# Patient Record
Sex: FEMALE | Race: AMERICAN INDIAN OR ALASKA NATIVE | NOT HISPANIC OR LATINO | URBAN - METROPOLITAN AREA
[De-identification: names, ages, dates, MRNs, and addresses within clinical notes are randomized per-mention and may not be internally consistent; named-entity substitution may affect disease eponyms.]

---

## 2023-06-08 ENCOUNTER — EMERGENCY (EMERGENCY)
Facility: HOSPITAL | Age: 68
LOS: 1 days | Discharge: HOME | End: 2023-06-08
Attending: EMERGENCY MEDICINE
Payer: COMMERCIAL

## 2023-06-08 VITALS
TEMPERATURE: 99 F | WEIGHT: 139.99 LBS | HEART RATE: 71 BPM | HEIGHT: 63 IN | OXYGEN SATURATION: 99 % | DIASTOLIC BLOOD PRESSURE: 81 MMHG | RESPIRATION RATE: 20 BRPM | SYSTOLIC BLOOD PRESSURE: 145 MMHG

## 2023-06-08 PROCEDURE — 99285 EMERGENCY DEPT VISIT HI MDM: CPT | Mod: 25

## 2023-06-08 PROCEDURE — 90715 TDAP VACCINE 7 YRS/> IM: CPT

## 2023-06-08 PROCEDURE — 99284 EMERGENCY DEPT VISIT MOD MDM: CPT

## 2023-06-08 PROCEDURE — 90471 IMMUNIZATION ADMIN: CPT

## 2023-06-08 RX ORDER — TETANUS TOXOID, REDUCED DIPHTHERIA TOXOID AND ACELLULAR PERTUSSIS VACCINE, ADSORBED 5; 2.5; 8; 8; 2.5 [IU]/.5ML; [IU]/.5ML; UG/.5ML; UG/.5ML; UG/.5ML
0.5 SUSPENSION INTRAMUSCULAR ONCE
Refills: 0 | Status: COMPLETED | OUTPATIENT
Start: 2023-06-08 | End: 2023-06-08

## 2023-06-08 RX ADMIN — TETANUS TOXOID, REDUCED DIPHTHERIA TOXOID AND ACELLULAR PERTUSSIS VACCINE, ADSORBED 0.5 MILLILITER(S): 5; 2.5; 8; 8; 2.5 SUSPENSION INTRAMUSCULAR at 21:53
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
## 2023-06-08 NOTE — ED PROVIDER NOTE - PATIENT PORTAL LINK FT
You can access the FollowMyHealth Patient Portal offered by [facility] by registering at the following website: [email or web address]. By joining [employer]’s FollowMyHealth portal, you will also be able to view your health information using other applications (apps) compatible with our system.
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
## 2023-06-08 NOTE — ED PROVIDER NOTE - NSFOLLOWUPCLINICS_GEN_ALL_ED_FT
[facility]  Wound Care  [address] [place] [address]  Phone: [phone]  Fax: [phone]  Follow Up Time: 1-3 Days

## 2023-06-13 ENCOUNTER — OUTPATIENT (OUTPATIENT)
Dept: OUTPATIENT SERVICES | Facility: HOSPITAL | Age: 68
LOS: 1 days | Discharge: HOME | End: 2023-06-13

## 2023-06-13 DIAGNOSIS — L89.90 PRESSURE ULCER OF UNSPECIFIED SITE, UNSPECIFIED STAGE: ICD-10-CM

## 2023-06-14 DIAGNOSIS — Z82.49 FAMILY HISTORY OF ISCHEMIC HEART DISEASE AND OTHER DISEASES OF THE CIRCULATORY SYSTEM: ICD-10-CM
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
## 2023-06-14 DIAGNOSIS — I10 ESSENTIAL (PRIMARY) HYPERTENSION: ICD-10-CM

## 2023-06-14 DIAGNOSIS — Z79.899 OTHER LONG TERM (CURRENT) DRUG THERAPY: ICD-10-CM

## 2023-06-14 DIAGNOSIS — R56.9 UNSPECIFIED CONVULSIONS: ICD-10-CM
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
## 2023-06-14 DIAGNOSIS — T31.10 BURNS INVOLVING 10-19% OF BODY SURFACE WITH 0% TO 9% THIRD DEGREE BURNS: ICD-10-CM
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
## 2023-06-14 DIAGNOSIS — Z79.82 LONG TERM (CURRENT) USE OF ASPIRIN: ICD-10-CM

## 2023-06-14 DIAGNOSIS — T23.231A BURN OF SECOND DEGREE OF MULTIPLE RIGHT FINGERS (NAIL), NOT INCLUDING THUMB, INITIAL ENCOUNTER: ICD-10-CM
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
## 2023-06-22 ENCOUNTER — OUTPATIENT (OUTPATIENT)
Dept: OUTPATIENT SERVICES | Facility: HOSPITAL | Age: 68
LOS: 1 days | Discharge: HOME | End: 2023-06-22

## 2023-06-22 DIAGNOSIS — L89.90 PRESSURE ULCER OF UNSPECIFIED SITE, UNSPECIFIED STAGE: ICD-10-CM

## 2023-06-23 DIAGNOSIS — L97.312 NON-PRESSURE CHRONIC ULCER OF RIGHT ANKLE WITH FAT LAYER EXPOSED: ICD-10-CM

## 2023-06-23 DIAGNOSIS — E11.621 TYPE 2 DIABETES MELLITUS WITH FOOT ULCER: ICD-10-CM

## 2023-06-23 DIAGNOSIS — L97.512 NON-PRESSURE CHRONIC ULCER OF OTHER PART OF RIGHT FOOT WITH FAT LAYER EXPOSED: ICD-10-CM

## 2023-06-23 DIAGNOSIS — I10 ESSENTIAL (PRIMARY) HYPERTENSION: ICD-10-CM

## 2023-06-23 DIAGNOSIS — Z79.899 OTHER LONG TERM (CURRENT) DRUG THERAPY: ICD-10-CM

## 2023-06-23 DIAGNOSIS — L03.116 CELLULITIS OF LEFT LOWER LIMB: ICD-10-CM

## 2023-06-23 DIAGNOSIS — I87.313 CHRONIC VENOUS HYPERTENSION (IDIOPATHIC) WITH ULCER OF BILATERAL LOWER EXTREMITY: ICD-10-CM

## 2023-06-23 DIAGNOSIS — Z79.84 LONG TERM (CURRENT) USE OF ORAL HYPOGLYCEMIC DRUGS: ICD-10-CM

## 2023-06-23 DIAGNOSIS — L03.115 CELLULITIS OF RIGHT LOWER LIMB: ICD-10-CM

## 2023-06-23 DIAGNOSIS — L97.522 NON-PRESSURE CHRONIC ULCER OF OTHER PART OF LEFT FOOT WITH FAT LAYER EXPOSED: ICD-10-CM

## 2023-06-23 DIAGNOSIS — F17.210 NICOTINE DEPENDENCE, CIGARETTES, UNCOMPLICATED: ICD-10-CM

## 2023-06-23 DIAGNOSIS — H26.8 OTHER SPECIFIED CATARACT: ICD-10-CM

## 2023-06-29 ENCOUNTER — OUTPATIENT (OUTPATIENT)
Dept: OUTPATIENT SERVICES | Facility: HOSPITAL | Age: 68
LOS: 1 days | Discharge: HOME | End: 2023-06-29

## 2023-06-29 DIAGNOSIS — L89.90 PRESSURE ULCER OF UNSPECIFIED SITE, UNSPECIFIED STAGE: ICD-10-CM

## 2023-06-30 DIAGNOSIS — T23.231A BURN OF SECOND DEGREE OF MULTIPLE RIGHT FINGERS (NAIL), NOT INCLUDING THUMB, INITIAL ENCOUNTER: ICD-10-CM

## 2023-06-30 DIAGNOSIS — Z79.899 OTHER LONG TERM (CURRENT) DRUG THERAPY: ICD-10-CM
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
## 2023-06-30 DIAGNOSIS — R56.9 UNSPECIFIED CONVULSIONS: ICD-10-CM
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
## 2023-06-30 DIAGNOSIS — Z79.82 LONG TERM (CURRENT) USE OF ASPIRIN: ICD-10-CM

## 2023-06-30 DIAGNOSIS — I10 ESSENTIAL (PRIMARY) HYPERTENSION: ICD-10-CM
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
## 2023-06-30 DIAGNOSIS — T31.10 BURNS INVOLVING 10-19% OF BODY SURFACE WITH 0% TO 9% THIRD DEGREE BURNS: ICD-10-CM

## 2023-07-06 ENCOUNTER — OUTPATIENT (OUTPATIENT)
Dept: OUTPATIENT SERVICES | Facility: HOSPITAL | Age: 68
LOS: 1 days | Discharge: HOME | End: 2023-07-06

## 2023-07-06 DIAGNOSIS — T23.231A BURN OF SECOND DEGREE OF MULTIPLE RIGHT FINGERS (NAIL), NOT INCLUDING THUMB, INITIAL ENCOUNTER: ICD-10-CM

## 2023-07-06 DIAGNOSIS — I10 ESSENTIAL (PRIMARY) HYPERTENSION: ICD-10-CM
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
## 2023-07-06 DIAGNOSIS — L89.90 PRESSURE ULCER OF UNSPECIFIED SITE, UNSPECIFIED STAGE: ICD-10-CM

## 2023-07-06 DIAGNOSIS — Z79.82 LONG TERM (CURRENT) USE OF ASPIRIN: ICD-10-CM
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
## 2023-07-06 DIAGNOSIS — T31.10 BURNS INVOLVING 10-19% OF BODY SURFACE WITH 0% TO 9% THIRD DEGREE BURNS: ICD-10-CM

## 2023-07-06 DIAGNOSIS — Z79.899 OTHER LONG TERM (CURRENT) DRUG THERAPY: ICD-10-CM
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
## 2023-07-06 DIAGNOSIS — R56.9 UNSPECIFIED CONVULSIONS: ICD-10-CM

## 2023-07-11 PROBLEM — Z00.00 ENCOUNTER FOR PREVENTIVE HEALTH EXAMINATION: Status: ACTIVE | Noted: 2023-07-11

## 2023-07-18 ENCOUNTER — APPOINTMENT (OUTPATIENT)
Dept: NEUROLOGY | Facility: CLINIC | Age: 68
End: 2023-07-18
Payer: MEDICARE

## 2023-07-18 VITALS
HEIGHT: 60 IN | BODY MASS INDEX: 28.47 KG/M2 | DIASTOLIC BLOOD PRESSURE: 77 MMHG | SYSTOLIC BLOOD PRESSURE: 118 MMHG | HEART RATE: 62 BPM | WEIGHT: 145 LBS

## 2023-07-18 PROCEDURE — 99205 OFFICE O/P NEW HI 60 MIN: CPT

## 2023-08-03 ENCOUNTER — INPATIENT (INPATIENT)
Facility: HOSPITAL | Age: 68
LOS: 1 days | Discharge: HOME | DRG: 101 | End: 2023-08-05
Attending: PSYCHIATRY & NEUROLOGY | Admitting: PSYCHIATRY & NEUROLOGY
Payer: COMMERCIAL

## 2023-08-03 VITALS
HEIGHT: 60 IN | TEMPERATURE: 98 F | DIASTOLIC BLOOD PRESSURE: 79 MMHG | OXYGEN SATURATION: 98 % | WEIGHT: 140.88 LBS | HEART RATE: 73 BPM | SYSTOLIC BLOOD PRESSURE: 119 MMHG | RESPIRATION RATE: 18 BRPM

## 2023-08-03 DIAGNOSIS — G40.909 EPILEPSY, UNSPECIFIED, NOT INTRACTABLE, WITHOUT STATUS EPILEPTICUS: ICD-10-CM

## 2023-08-03 LAB
ALBUMIN SERPL ELPH-MCNC: 4.6 G/DL — SIGNIFICANT CHANGE UP (ref 3.3–5)
ALP SERPL-CCNC: 88 U/L — SIGNIFICANT CHANGE UP (ref 40–120)
ALT FLD-CCNC: 17 U/L — SIGNIFICANT CHANGE UP (ref 10–45)
ANION GAP SERPL CALC-SCNC: 13 MMOL/L — SIGNIFICANT CHANGE UP (ref 5–17)
AST SERPL-CCNC: 25 U/L — SIGNIFICANT CHANGE UP (ref 10–40)
BASOPHILS # BLD AUTO: 0 K/UL — SIGNIFICANT CHANGE UP (ref 0–0.2)
BASOPHILS NFR BLD AUTO: 0 % — SIGNIFICANT CHANGE UP (ref 0–2)
BILIRUB SERPL-MCNC: 0.2 MG/DL — SIGNIFICANT CHANGE UP (ref 0.2–1.2)
BUN SERPL-MCNC: 15 MG/DL — SIGNIFICANT CHANGE UP (ref 7–23)
CALCIUM SERPL-MCNC: 10 MG/DL — SIGNIFICANT CHANGE UP (ref 8.4–10.5)
CARBAMAZEPINE SERPL-MCNC: 6.7 UG/ML — SIGNIFICANT CHANGE UP (ref 4–12)
CHLORIDE SERPL-SCNC: 102 MMOL/L — SIGNIFICANT CHANGE UP (ref 96–108)
CO2 SERPL-SCNC: 26 MMOL/L — SIGNIFICANT CHANGE UP (ref 22–31)
CREAT SERPL-MCNC: 0.56 MG/DL — SIGNIFICANT CHANGE UP (ref 0.5–1.3)
EGFR: 101 ML/MIN/1.73M2 — SIGNIFICANT CHANGE UP
EOSINOPHIL # BLD AUTO: 0.25 K/UL — SIGNIFICANT CHANGE UP (ref 0–0.5)
EOSINOPHIL NFR BLD AUTO: 2.6 % — SIGNIFICANT CHANGE UP (ref 0–6)
GLUCOSE SERPL-MCNC: 116 MG/DL — HIGH (ref 70–99)
HCT VFR BLD CALC: 38.7 % — SIGNIFICANT CHANGE UP (ref 34.5–45)
HGB BLD-MCNC: 12.9 G/DL — SIGNIFICANT CHANGE UP (ref 11.5–15.5)
LYMPHOCYTES # BLD AUTO: 4.13 K/UL — HIGH (ref 1–3.3)
LYMPHOCYTES # BLD AUTO: 43.4 % — SIGNIFICANT CHANGE UP (ref 13–44)
MAGNESIUM SERPL-MCNC: 2.2 MG/DL — SIGNIFICANT CHANGE UP (ref 1.6–2.6)
MANUAL SMEAR VERIFICATION: SIGNIFICANT CHANGE UP
MCHC RBC-ENTMCNC: 30.6 PG — SIGNIFICANT CHANGE UP (ref 27–34)
MCHC RBC-ENTMCNC: 33.3 GM/DL — SIGNIFICANT CHANGE UP (ref 32–36)
MCV RBC AUTO: 91.9 FL — SIGNIFICANT CHANGE UP (ref 80–100)
MONOCYTES # BLD AUTO: 1.18 K/UL — HIGH (ref 0–0.9)
MONOCYTES NFR BLD AUTO: 12.4 % — SIGNIFICANT CHANGE UP (ref 2–14)
NEUTROPHILS # BLD AUTO: 3.96 K/UL — SIGNIFICANT CHANGE UP (ref 1.8–7.4)
NEUTROPHILS NFR BLD AUTO: 41.6 % — LOW (ref 43–77)
NRBC # BLD: 1 /100 — HIGH (ref 0–0)
PLAT MORPH BLD: NORMAL — SIGNIFICANT CHANGE UP
PLATELET # BLD AUTO: 150 K/UL — SIGNIFICANT CHANGE UP (ref 150–400)
POTASSIUM SERPL-MCNC: 4.1 MMOL/L — SIGNIFICANT CHANGE UP (ref 3.5–5.3)
POTASSIUM SERPL-SCNC: 4.1 MMOL/L — SIGNIFICANT CHANGE UP (ref 3.5–5.3)
PROT SERPL-MCNC: 8.2 G/DL — SIGNIFICANT CHANGE UP (ref 6–8.3)
RBC # BLD: 4.21 M/UL — SIGNIFICANT CHANGE UP (ref 3.8–5.2)
RBC # FLD: 14.4 % — SIGNIFICANT CHANGE UP (ref 10.3–14.5)
RBC BLD AUTO: SIGNIFICANT CHANGE UP
SODIUM SERPL-SCNC: 141 MMOL/L — SIGNIFICANT CHANGE UP (ref 135–145)
WBC # BLD: 9.51 K/UL — SIGNIFICANT CHANGE UP (ref 3.8–10.5)
WBC # FLD AUTO: 9.51 K/UL — SIGNIFICANT CHANGE UP (ref 3.8–10.5)

## 2023-08-03 PROCEDURE — 99223 1ST HOSP IP/OBS HIGH 75: CPT

## 2023-08-03 PROCEDURE — 95720 EEG PHY/QHP EA INCR W/VEEG: CPT

## 2023-08-03 RX ORDER — CALCIUM CARBONATE 500(1250)
1 TABLET ORAL AT BEDTIME
Refills: 0 | Status: DISCONTINUED | OUTPATIENT
Start: 2023-08-03 | End: 2023-08-05

## 2023-08-03 RX ORDER — LORATADINE 10 MG/1
10 TABLET ORAL AT BEDTIME
Refills: 0 | Status: DISCONTINUED | OUTPATIENT
Start: 2023-08-03 | End: 2023-08-05

## 2023-08-03 RX ORDER — ATORVASTATIN CALCIUM 80 MG/1
10 TABLET, FILM COATED ORAL AT BEDTIME
Refills: 0 | Status: DISCONTINUED | OUTPATIENT
Start: 2023-08-03 | End: 2023-08-05

## 2023-08-03 RX ORDER — PANTOPRAZOLE SODIUM 20 MG/1
40 TABLET, DELAYED RELEASE ORAL AT BEDTIME
Refills: 0 | Status: DISCONTINUED | OUTPATIENT
Start: 2023-08-03 | End: 2023-08-05

## 2023-08-03 RX ORDER — LACOSAMIDE 50 MG/1
100 TABLET ORAL ONCE
Refills: 0 | Status: DISCONTINUED | OUTPATIENT
Start: 2023-08-03 | End: 2023-08-05

## 2023-08-03 RX ORDER — LOSARTAN POTASSIUM 100 MG/1
100 TABLET, FILM COATED ORAL AT BEDTIME
Refills: 0 | Status: DISCONTINUED | OUTPATIENT
Start: 2023-08-03 | End: 2023-08-05

## 2023-08-03 RX ORDER — ASPIRIN/CALCIUM CARB/MAGNESIUM 324 MG
81 TABLET ORAL AT BEDTIME
Refills: 0 | Status: DISCONTINUED | OUTPATIENT
Start: 2023-08-03 | End: 2023-08-05

## 2023-08-03 RX ORDER — ACETAMINOPHEN 500 MG
650 TABLET ORAL EVERY 6 HOURS
Refills: 0 | Status: DISCONTINUED | OUTPATIENT
Start: 2023-08-03 | End: 2023-08-05

## 2023-08-03 RX ORDER — ENOXAPARIN SODIUM 100 MG/ML
40 INJECTION SUBCUTANEOUS EVERY 24 HOURS
Refills: 0 | Status: DISCONTINUED | OUTPATIENT
Start: 2023-08-03 | End: 2023-08-05

## 2023-08-03 RX ORDER — AMLODIPINE BESYLATE 2.5 MG/1
10 TABLET ORAL AT BEDTIME
Refills: 0 | Status: DISCONTINUED | OUTPATIENT
Start: 2023-08-03 | End: 2023-08-05

## 2023-08-03 RX ORDER — METOPROLOL TARTRATE 50 MG
200 TABLET ORAL DAILY
Refills: 0 | Status: DISCONTINUED | OUTPATIENT
Start: 2023-08-03 | End: 2023-08-03

## 2023-08-03 RX ORDER — CHOLECALCIFEROL (VITAMIN D3) 125 MCG
1000 CAPSULE ORAL AT BEDTIME
Refills: 0 | Status: DISCONTINUED | OUTPATIENT
Start: 2023-08-03 | End: 2023-08-05

## 2023-08-03 RX ORDER — FOLIC ACID 0.8 MG
1 TABLET ORAL AT BEDTIME
Refills: 0 | Status: DISCONTINUED | OUTPATIENT
Start: 2023-08-03 | End: 2023-08-05

## 2023-08-03 RX ORDER — DONEPEZIL HYDROCHLORIDE 10 MG/1
10 TABLET, FILM COATED ORAL AT BEDTIME
Refills: 0 | Status: DISCONTINUED | OUTPATIENT
Start: 2023-08-03 | End: 2023-08-05

## 2023-08-03 RX ORDER — METOPROLOL TARTRATE 50 MG
100 TABLET ORAL
Refills: 0 | Status: DISCONTINUED | OUTPATIENT
Start: 2023-08-03 | End: 2023-08-05

## 2023-08-03 RX ORDER — CARBAMAZEPINE 200 MG
200 TABLET ORAL
Refills: 0 | Status: DISCONTINUED | OUTPATIENT
Start: 2023-08-03 | End: 2023-08-05

## 2023-08-03 RX ORDER — MONTELUKAST 4 MG/1
10 TABLET, CHEWABLE ORAL AT BEDTIME
Refills: 0 | Status: DISCONTINUED | OUTPATIENT
Start: 2023-08-03 | End: 2023-08-05

## 2023-08-03 RX ADMIN — Medication 1 MILLIGRAM(S): at 21:34

## 2023-08-03 RX ADMIN — Medication 200 MILLIGRAM(S): at 18:30

## 2023-08-03 RX ADMIN — Medication 1000 UNIT(S): at 21:35

## 2023-08-03 RX ADMIN — LORATADINE 10 MILLIGRAM(S): 10 TABLET ORAL at 21:35

## 2023-08-03 RX ADMIN — MONTELUKAST 10 MILLIGRAM(S): 4 TABLET, CHEWABLE ORAL at 21:34

## 2023-08-03 RX ADMIN — Medication 100 MILLIGRAM(S): at 17:45

## 2023-08-03 RX ADMIN — PANTOPRAZOLE SODIUM 40 MILLIGRAM(S): 20 TABLET, DELAYED RELEASE ORAL at 21:34

## 2023-08-03 RX ADMIN — Medication 81 MILLIGRAM(S): at 21:34

## 2023-08-03 RX ADMIN — Medication 650 MILLIGRAM(S): at 21:46

## 2023-08-03 RX ADMIN — ATORVASTATIN CALCIUM 10 MILLIGRAM(S): 80 TABLET, FILM COATED ORAL at 21:34

## 2023-08-03 RX ADMIN — Medication 1 TABLET(S): at 21:35

## 2023-08-03 RX ADMIN — Medication 650 MILLIGRAM(S): at 22:16

## 2023-08-03 RX ADMIN — AMLODIPINE BESYLATE 10 MILLIGRAM(S): 2.5 TABLET ORAL at 21:34

## 2023-08-03 RX ADMIN — ENOXAPARIN SODIUM 40 MILLIGRAM(S): 100 INJECTION SUBCUTANEOUS at 21:36

## 2023-08-03 RX ADMIN — DONEPEZIL HYDROCHLORIDE 10 MILLIGRAM(S): 10 TABLET, FILM COATED ORAL at 21:35

## 2023-08-03 NOTE — H&P ADULT - NSHPPHYSICALEXAM_GEN_ALL_CORE
Vitals:  T(C): 36.9 (08-03-23 @ 15:18), Max: 36.9 (08-03-23 @ 15:18)  HR: 73 (08-03-23 @ 15:18) (73 - 73)  BP: 119/79 (08-03-23 @ 15:18) (119/79 - 119/79)  RR: 18 (08-03-23 @ 15:18) (18 - 18)  SpO2: 98% (08-03-23 @ 15:18) (98% - 98%)    Physical Examination: INCOMPLETE  General - NAD  Cardiovascular - Peripheral pulses palpable, no edema  Eyes - Fundoscopy with flat, sharp optic discs and no hemorrhage or exudates; Fundoscopy not well visualized; Fundoscopy not performed due to safety precautions in the setting of the COVID-19 pandemic    Neurologic Exam:  Mental status - Awake, Alert, Oriented to person, place, and time. Speech fluent, repetition and naming intact. Follows simple and complex commands. Attention/concentration, recent and remote memory (including registration and recall), and fund of knowledge intact    Cranial nerves - PERRLA, VFF, EOMI, face sensation (V1-V3) intact b/l, facial strength intact without asymmetry b/l, hearing intact b/l, palate with symmetric elevation, trapezius OR sternocleidomastiod 5/5 strength b/l, tongue midline on protrusion with full lateral movement    Motor - Normal bulk and tone throughout. No pronator drift.  Strength testing            Deltoid      Biceps      Triceps     Wrist Extension    Wrist Flexion     Interossei         R            5                 5               5                     5                              5                        5                 5  L             5                 5               5                     5                              5                        5                 5              Hip Flexion    Hip Extension    Knee Flexion    Knee Extension    Dorsiflexion    Plantar Flexion  R              5                           5                       5                           5                            5                          5  L              5                           5                        5                           5                            5                          5    Sensation - Light touch/temperature OR pain/vibration intact throughout    DTR's -             Biceps      Triceps     Brachioradialis      Patellar    Ankle    Toes/plantar response  R             2+             2+                  2+                       2+            2+                 Down  L              2+             2+                 2+                        2+           2+                 Down    Coordination - Finger to Nose intact b/l. No tremors appreciated    Gait and station - Normal casual gait. Romberg (-) Vitals:  T(C): 36.9 (08-03-23 @ 15:18), Max: 36.9 (08-03-23 @ 15:18)  HR: 73 (08-03-23 @ 15:18) (73 - 73)  BP: 119/79 (08-03-23 @ 15:18) (119/79 - 119/79)  RR: 18 (08-03-23 @ 15:18) (18 - 18)  SpO2: 98% (08-03-23 @ 15:18) (98% - 98%)    Physical Examination: VERY LIMITED d/t patient preferring female providers and none available to examine patient at this time.  General - NAD, sitting up in bed, vEEG leads are being placed  Cardiovascular - No LE edema  Eyes - Fundoscopy not performed due to safety precautions in the setting of the COVID-19 pandemic, conjunctiva are non-injected    Neurologic Exam:  Mental status - Awake, Alert, Oriented to person, place (knows hospital but not the name), and time. Struggles with repetition (but may also represent difficulty with English language). Follows simple commands. Struggles with complex command. 2/3 on 3-word recall, 3/3 with cue - slow to remember. Does appear to have a degree of cognitive slowing.    Cranial nerves - Pupils are pinpoint in the room light - left pupil smaller than right, EOMI, facial strength intact without asymmetry b/l, hearing grossly intact, palate with symmetric elevation, tongue midline on protrusion with full lateral movement    Motor - BASS equally, no drift UE/LE    Sensation - Light touch intact throughout, vibration sensation decreased in the LEs    DTR's - Attempted to elicit reflexes but could not with patient's positioning and inability to readjust patient    Coordination - No tremors appreciated    Gait and station - Unable to walk patient d/t vEEG leads being applied

## 2023-08-03 NOTE — H&P ADULT - NSHPREVIEWOFSYSTEMS_GEN_ALL_CORE
Review of Systems:  INCOMPLETE   CONSTITUTIONAL: No fevers or chills  EYES AND ENT: No visual changes or no throat pain   NECK: No pain or stiffness  RESPIRATORY: No hemoptysis or shortness of breath  CARDIOVASCULAR: No chest pain or palpitations  GASTROINTESTINAL: No melena or hematochezia  GENITOURINARY: No dysuria or hematuria  NEUROLOGICAL: +As stated in HPI above  SKIN: No itching, burning, rashes, or lesions   All other review of systems is negative unless indicated above. All other review of systems is negative unless indicated above.

## 2023-08-03 NOTE — H&P ADULT - ASSESSMENT
Patient JEFF ARGUETA is a 67y (1955) woman who presents to [facility] for elective EMU admission for medication tapering for ictal/interictal recording. Patient was seen by Dr. Oconnor in the outpatient setting on 7/18/2023. Currently taking carbamezapine XR 200mg BID.    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 carbamazepine XR, with ongoing memory decline 2/2 seizures; EMU admission for medication tapering for ictal/interictal recording; consideration for cenobamate    [] Admit to EMU  [] vEEG  [] Telemetry  [] CBC, CMP, Mg, carbamazepine level  [] CONTINUE carbamazepine XR 200mg BID  [] Seizure/Fall Precautions  [] CM/SW Consults    Diet: Regular  DVT Prophylaxis: enoxaparin 40mg subcutaneous daily    Rescues:  - lorazepam 2mg IV  - lacosamide 100mg IV    Home ASM Regimen:  carbamazepine XR 200mg BID Patient JEFF ARGUETA is a 67y (1955) woman who presents to [facility] for elective EMU admission for medication tapering for ictal/interictal recording. Patient was seen by Dr. Oconnor in the outpatient setting on 7/18/2023. Currently taking carbamezapine XR 200mg BID.    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 outpatient)  [] Seizure/Fall Precautions  [] CM/SW Consults    Diet: Regular (Halal)  DVT Prophylaxis: enoxaparin 40mg subcutaneous daily    Rescues:  - lorazepam 2mg IV  - lacosamide 100mg IV    Home ASM Regimen:  carbamazepine XR 200mg BID    Please note: STRONGLY PREFERS FEMALE PROVIDERS, male provider can examine patient if no female provider is available or if there is an emergency

## 2023-08-03 NOTE — H&P ADULT - HISTORY OF PRESENT ILLNESS
Patient JEFF ARGUETA is a 67y (1955) woman who presents to [facility] for elective EMU admission for medication tapering for ictal/interictal recording. Patient was seen by Dr. Oconnor in the outpatient setting on 7/18/2023.    Per chart review:  Patient has reportedly had epilepsy since age 28. Previously, seizures coincided with iron injections. Semiology was described as LOC and convulsions. Previously had been taking phenobarbital 50mg daily, phenytoin 100mg BID, OXC 300mg BID. Now taking Tegretol XR brand 200mg BID. Aura described as spinning sensation, followed by a crying sound and LOC. Also has had periods of unresponsiveness and automatic hand movements - without convulsions. Has had an EEG in the past with documented LT rhythmic theta, but with no seizures. Normal MRI about 4 years ago.    Today, *** Patient JEFF ARGUETA is a 67y (1955) woman who presents to [facility] for elective EMU admission for medication tapering for ictal/interictal recording. Patient was seen by Dr. Oconnor in the outpatient setting on 7/18/2023.    Per chart review:  Patient has reportedly had epilepsy since age 28. Previously, seizures coincided with iron injections. Semiology was described as LOC and convulsions. Previously had been taking phenobarbital 50mg daily, phenytoin 100mg BID, OXC 300mg BID. Now taking Tegretol XR brand 200mg BID. Aura described as spinning sensation, followed by a crying sound and LOC. Also has had periods of unresponsiveness and automatic hand movements - without convulsions. Has had an EEG in the past with documented LT rhythmic theta, but with no seizures. Normal MRI about 4 years ago.    Today, patient is encountered bedside while having vEEG leads applied. She is accompanied by her . Amharic  used for initial portion of interview. D/w patient and her  that vEEG leads will be applied, patient will be monitored overnight on her ASM. Adjustments may be made to medication in the AM. Both express understanding.    Later, patient's CARLOTA arrives bedside. He provides Amharic translation. Patient is Che[employer] - halal meal order has been placed. Patient prefers female providers - as such, physical exam is limited.    Patient is a nonsmoker. No alcohol/recreational drug use. Patient uses a cane to ambulate. Very slow climbing stairs. History of coronary artery bypass per CARLOTA - approximately 12 years ago. Denies history of stroke. Medication list obtained from CARLOTA.

## 2023-08-03 NOTE — PATIENT PROFILE ADULT - FALL HARM RISK - RISK INTERVENTIONS
No
Assistance OOB with selected safe patient handling equipment/Communicate Fall Risk and Risk Factors to all staff, patient, and family/Discuss with provider need for PT consult/Monitor gait and stability/Provide patient with walking aids - walker, cane, crutches/Reinforce activity limits and safety measures with patient and family/Visual Cue: Yellow wristband/Bed in lowest position, wheels locked, appropriate side rails in place/Call bell, personal items and telephone in reach/Instruct patient to call for assistance before getting out of bed or chair/Non-slip footwear when patient is out of bed/[place] to call system/Physically safe environment - no spills, clutter or unnecessary equipment/Purposeful Proactive Rounding/Room/bathroom lighting operational, light cord in reach

## 2023-08-04 LAB
ALBUMIN SERPL ELPH-MCNC: 3.8 G/DL — SIGNIFICANT CHANGE UP (ref 3.3–5)
ALP SERPL-CCNC: 76 U/L — SIGNIFICANT CHANGE UP (ref 40–120)
ALT FLD-CCNC: 14 U/L — SIGNIFICANT CHANGE UP (ref 10–45)
ANION GAP SERPL CALC-SCNC: 13 MMOL/L — SIGNIFICANT CHANGE UP (ref 5–17)
AST SERPL-CCNC: 21 U/L — SIGNIFICANT CHANGE UP (ref 10–40)
BILIRUB SERPL-MCNC: 0.2 MG/DL — SIGNIFICANT CHANGE UP (ref 0.2–1.2)
BUN SERPL-MCNC: 14 MG/DL — SIGNIFICANT CHANGE UP (ref 7–23)
CALCIUM SERPL-MCNC: 9.7 MG/DL — SIGNIFICANT CHANGE UP (ref 8.4–10.5)
CHLORIDE SERPL-SCNC: 103 MMOL/L — SIGNIFICANT CHANGE UP (ref 96–108)
CO2 SERPL-SCNC: 22 MMOL/L — SIGNIFICANT CHANGE UP (ref 22–31)
CREAT SERPL-MCNC: 0.57 MG/DL — SIGNIFICANT CHANGE UP (ref 0.5–1.3)
EGFR: 100 ML/MIN/1.73M2 — SIGNIFICANT CHANGE UP
GLUCOSE SERPL-MCNC: 100 MG/DL — HIGH (ref 70–99)
HCT VFR BLD CALC: 37.1 % — SIGNIFICANT CHANGE UP (ref 34.5–45)
HCV AB S/CO SERPL IA: 0.09 S/CO — SIGNIFICANT CHANGE UP (ref 0–0.99)
HCV AB SERPL-IMP: SIGNIFICANT CHANGE UP
HGB BLD-MCNC: 12.5 G/DL — SIGNIFICANT CHANGE UP (ref 11.5–15.5)
MAGNESIUM SERPL-MCNC: 2.1 MG/DL — SIGNIFICANT CHANGE UP (ref 1.6–2.6)
MCHC RBC-ENTMCNC: 31.4 PG — SIGNIFICANT CHANGE UP (ref 27–34)
MCHC RBC-ENTMCNC: 33.7 GM/DL — SIGNIFICANT CHANGE UP (ref 32–36)
MCV RBC AUTO: 93.2 FL — SIGNIFICANT CHANGE UP (ref 80–100)
NRBC # BLD: 0 /100 WBCS — SIGNIFICANT CHANGE UP (ref 0–0)
PHOSPHATE SERPL-MCNC: 4.4 MG/DL — SIGNIFICANT CHANGE UP (ref 2.5–4.5)
PLATELET # BLD AUTO: 131 K/UL — LOW (ref 150–400)
POTASSIUM SERPL-MCNC: 4 MMOL/L — SIGNIFICANT CHANGE UP (ref 3.5–5.3)
POTASSIUM SERPL-SCNC: 4 MMOL/L — SIGNIFICANT CHANGE UP (ref 3.5–5.3)
PROT SERPL-MCNC: 7.1 G/DL — SIGNIFICANT CHANGE UP (ref 6–8.3)
RBC # BLD: 3.98 M/UL — SIGNIFICANT CHANGE UP (ref 3.8–5.2)
RBC # FLD: 14.2 % — SIGNIFICANT CHANGE UP (ref 10.3–14.5)
SODIUM SERPL-SCNC: 138 MMOL/L — SIGNIFICANT CHANGE UP (ref 135–145)
WBC # BLD: 8.47 K/UL — SIGNIFICANT CHANGE UP (ref 3.8–10.5)
WBC # FLD AUTO: 8.47 K/UL — SIGNIFICANT CHANGE UP (ref 3.8–10.5)

## 2023-08-04 PROCEDURE — 99232 SBSQ HOSP IP/OBS MODERATE 35: CPT

## 2023-08-04 PROCEDURE — 95720 EEG PHY/QHP EA INCR W/VEEG: CPT

## 2023-08-04 RX ORDER — CENOBAMATE 350 MG/DAY
1 KIT ORAL
Qty: 30 | Refills: 0
Start: 2023-08-04

## 2023-08-04 RX ORDER — CENOBAMATE 350 MG/DAY
12.5 KIT ORAL AT BEDTIME
Refills: 0 | Status: DISCONTINUED | OUTPATIENT
Start: 2023-08-04 | End: 2023-08-05

## 2023-08-04 RX ADMIN — Medication 1000 UNIT(S): at 22:38

## 2023-08-04 RX ADMIN — Medication 100 MILLIGRAM(S): at 17:47

## 2023-08-04 RX ADMIN — Medication 1 MILLIGRAM(S): at 22:40

## 2023-08-04 RX ADMIN — Medication 1 APPLICATION(S): at 05:40

## 2023-08-04 RX ADMIN — ATORVASTATIN CALCIUM 10 MILLIGRAM(S): 80 TABLET, FILM COATED ORAL at 22:39

## 2023-08-04 RX ADMIN — MONTELUKAST 10 MILLIGRAM(S): 4 TABLET, CHEWABLE ORAL at 22:39

## 2023-08-04 RX ADMIN — AMLODIPINE BESYLATE 10 MILLIGRAM(S): 2.5 TABLET ORAL at 22:38

## 2023-08-04 RX ADMIN — ENOXAPARIN SODIUM 40 MILLIGRAM(S): 100 INJECTION SUBCUTANEOUS at 22:37

## 2023-08-04 RX ADMIN — LOSARTAN POTASSIUM 100 MILLIGRAM(S): 100 TABLET, FILM COATED ORAL at 22:40

## 2023-08-04 RX ADMIN — Medication 81 MILLIGRAM(S): at 23:28

## 2023-08-04 RX ADMIN — Medication 1 APPLICATION(S): at 17:50

## 2023-08-04 RX ADMIN — Medication 1 TABLET(S): at 22:38

## 2023-08-04 RX ADMIN — CENOBAMATE 12.5 MILLIGRAM(S): KIT ORAL at 23:28

## 2023-08-04 RX ADMIN — Medication 200 MILLIGRAM(S): at 05:39

## 2023-08-04 RX ADMIN — Medication 100 MILLIGRAM(S): at 05:40

## 2023-08-04 RX ADMIN — LORATADINE 10 MILLIGRAM(S): 10 TABLET ORAL at 22:39

## 2023-08-04 RX ADMIN — DONEPEZIL HYDROCHLORIDE 10 MILLIGRAM(S): 10 TABLET, FILM COATED ORAL at 22:38

## 2023-08-04 RX ADMIN — PANTOPRAZOLE SODIUM 40 MILLIGRAM(S): 20 TABLET, DELAYED RELEASE ORAL at 22:40

## 2023-08-04 RX ADMIN — Medication 200 MILLIGRAM(S): at 17:47

## 2023-08-05 ENCOUNTER — TRANSCRIPTION ENCOUNTER (OUTPATIENT)
Age: 68
End: 2023-08-05

## 2023-08-05 VITALS
RESPIRATION RATE: 18 BRPM | DIASTOLIC BLOOD PRESSURE: 68 MMHG | HEART RATE: 67 BPM | TEMPERATURE: 98 F | SYSTOLIC BLOOD PRESSURE: 102 MMHG | OXYGEN SATURATION: 97 %

## 2023-08-05 PROCEDURE — 84100 ASSAY OF PHOSPHORUS: CPT

## 2023-08-05 PROCEDURE — 83735 ASSAY OF MAGNESIUM: CPT

## 2023-08-05 PROCEDURE — 80053 COMPREHEN METABOLIC PANEL: CPT

## 2023-08-05 PROCEDURE — 86803 HEPATITIS C AB TEST: CPT

## 2023-08-05 PROCEDURE — 85027 COMPLETE CBC AUTOMATED: CPT

## 2023-08-05 PROCEDURE — 95718 EEG PHYS/QHP 2-12 HR W/VEEG: CPT

## 2023-08-05 PROCEDURE — 95700 EEG CONT REC W/VID EEG TECH: CPT

## 2023-08-05 PROCEDURE — 95716 VEEG EA 12-26HR CONT MNTR: CPT

## 2023-08-05 PROCEDURE — 95713 VEEG 2-12 HR CONT MNTR: CPT

## 2023-08-05 PROCEDURE — 85025 COMPLETE CBC W/AUTO DIFF WBC: CPT

## 2023-08-05 PROCEDURE — 99238 HOSP IP/OBS DSCHRG MGMT 30/<: CPT

## 2023-08-05 PROCEDURE — 80156 ASSAY CARBAMAZEPINE TOTAL: CPT

## 2023-08-05 RX ORDER — CLOTRIMAZOLE AND BETAMETHASONE DIPROPIONATE 10; .5 MG/G; MG/G
1 CREAM TOPICAL
Refills: 0 | Status: DISCONTINUED | OUTPATIENT
Start: 2023-08-05 | End: 2023-08-05

## 2023-08-05 RX ADMIN — CLOTRIMAZOLE AND BETAMETHASONE DIPROPIONATE 1 APPLICATION(S): 10; .5 CREAM TOPICAL at 05:45

## 2023-08-05 RX ADMIN — Medication 200 MILLIGRAM(S): at 05:26

## 2023-08-05 RX ADMIN — Medication 1 APPLICATION(S): at 05:27

## 2023-08-05 RX ADMIN — Medication 100 MILLIGRAM(S): at 05:26

## 2023-08-05 NOTE — DISCHARGE NOTE NURSING/CASE MANAGEMENT/SOCIAL WORK - NSDCPEFALRISK_GEN_ALL_CORE
For information on Fall & Injury Prevention, visit: [email or web address] OR  [email or web address] OR  https://www.cdc.gov/steadi/patient.html

## 2023-08-05 NOTE — DISCHARGE NOTE NURSING/CASE MANAGEMENT/SOCIAL WORK - NSDCVIVACCINE_GEN_ALL_CORE_FT
Tdap; 08-Jun-2023 21:53; Jodee Lau); [employer]; [id number] (Exp. Date: 22-Mar-2025); IntraMuscular; Deltoid Right.; 0.5 milliLiter(s); VIS (VIS Published: 09-May-2013, VIS Presented: 08-Jun-2023);

## 2023-08-05 NOTE — DISCHARGE NOTE PROVIDER - HOSPITAL COURSE
67y (1955) woman with PMH of L temporal lobe epilepsy  who presents to [facility] for elective EMU admission for medication tapering for ictal/interictal recording. Patient was seen by Dr. Oconnor in the outpatient setting on 7/18/2023 and was sent in for elective admission to taper medications. Patient is taking carbamazepine XR  200 mg bid.    During the hospitalization, was monitored on vEEG and result is as noted below:  EEG 8/5:  IMPRESSION/CLINICAL CORRELATE:  This is a normal VEEG. No epileptiform pattern or seizures were recorded    Patient was continued on hoome carbamazepine  mg bid and was started on Xcopri 12.5 mg qhs. Will continue to take titration pack and adjust doses as indicated with Dr. Oconnor as outpatient.    Patient is medically stable and ready for discharge home. Pt will have outpt fu with Dr. Oconnor.    67y (1955) woman with PMH of L temporal lobe epilepsy  who presents to [facility] for elective EMU admission for medication tapering for ictal/interictal recording. Patient was seen by Dr. Oconnor in the outpatient setting on 7/18/2023 and was sent in for elective admission to taper medications. Patient is taking carbamazepine XR  200 mg bid.    During the hospitalization, was monitored on vEEG and result is as noted below:  EEG 8/5:  IMPRESSION/CLINICAL CORRELATE:  This is a normal VEEG. No epileptiform pattern or seizures were recorded    Patient was continued on hoome carbamazepine BRAND  mg bid and was started on Xcopri 12.5 mg qhs. Will continue to take titration pack and adjust doses as indicated with Dr. Oconnor as outpatient.    Patient is medically stable and ready for discharge home. Pt will have outpt fu with Dr. Oconnor.    67y (1955) woman with PMH of L temporal lobe epilepsy  who presents to [facility] for elective EMU admission for medication tapering for ictal/interictal recording. Patient was seen by Dr. Oconnor in the outpatient setting on 7/18/2023 and was sent in for elective admission to taper medications. Patient is taking carbamazepine XR  200 mg bid.    During the hospitalization, was monitored on vEEG and result is as noted below:    Left temporal slowing    Patient was continued on hoome carbamazepine BRAND  mg bid and was started on Xcopri 12.5 mg qhs. Will continue to take titration pack and adjust doses as indicated with Dr. Oconnor as outpatient.    Patient is medically stable and ready for discharge home. Pt will have outpt fu with Dr. Oconnor.

## 2023-08-05 NOTE — PROGRESS NOTE ADULT - ASSESSMENT
67y (1955) woman who presents to [facility] for elective EMU admission for medication tapering for ictal/interictal recording. Patient was seen by Dr. Oconnor in the outpatient setting on 7/18/2023. Currently taking carbamezapine XR 200mg BID. Carbamazepine 6.7. Interval neuro exam stable.     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 carbamazepine XR, with ongoing memory decline 2/2 seizures; EMU admission for medication tapering for ictal/interictal recording; consideration for cenobamate    Plan:  [] vEEG  [] Telemetry  [] Started on Xcopri 12.5 mg qhs   [] C/w home carbamazepine XR 200mg BID  [] Outpatient MRI brain with epilepsy protocol   [] Seizure/Fall Precautions    Diet: Regular (Halal)  DVT Prophylaxis: enoxaparin 40mg subcutaneous daily    Rescues:  - lorazepam 2mg IV  - lacosamide 100mg IV    Home ASM Regimen:  carbamazepine XR 200mg BID    Please note: STRONGLY PREFERS FEMALE PROVIDERS, male provider can examine patient if no female provider is available or if there is an emergency   67y (1955) woman who presents to [facility] for elective EMU admission for medication tapering for ictal/interictal recording. Patient was seen by Dr. Oconnor in the outpatient setting on 7/18/2023. Currently taking carbamezapine XR 200mg BID. Carbamazepine 6.7. Interval neuro exam stable.     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 carbamazepine XR, with ongoing memory decline 2/2 seizures; EMU admission for medication tapering for ictal/interictal recording; consideration for cenobamate    Plan:  [] vEEG Discontinue  [] Telemetry  [] Started on Xcopri 12.5 mg qhs   [] C/w home carbamazepine XR 200mg BID  [] Outpatient MRI brain with epilepsy protocol   [] Seizure/Fall Precautions    Diet: Regular (Halal)  DVT Prophylaxis: enoxaparin 40mg subcutaneous daily    Rescues:  - lorazepam 2mg IV  - lacosamide 100mg IV    Home ASM Regimen:  carbamazepine XR 200mg BID    Please note: STRONGLY PREFERS FEMALE PROVIDERS, male provider can examine patient if no female provider is available or if there is an emergency

## 2023-08-05 NOTE — DISCHARGE NOTE PROVIDER - NSDCCPCAREPLAN_GEN_ALL_CORE_FT
PRINCIPAL DISCHARGE DIAGNOSIS  Diagnosis: Seizure  Assessment and Plan of Treatment: You have history of left temporal lobe seizure and was admitted to epilepsy monitoring unit for tapering of the medications. You were started on new antiseizure medication called Xcopri and you will continue to take it per titration regimen indicated on the package. You will continue to take your home carbamazepine. Please follow up with Dr. Oconnor as an outaptient to further manage and adjust seizure medications as indicated.  Seizure Safety Instructions  1. [facility] law mandates you to self-report your seizure disorder to [facility], and be seizure-free for 1yr before you can drive.   2. Avoid swimming, diving, taking a bath, cooking, use of motorized machineries.  3. Avoid climbing a ladder, trees or any height.  4. Use machines with safety switches.  5. Always be aware of your surroundings and make sure family and friends are aware of your seizures.  6. Use non-breakable dishes.  7. Consider wearing a medical bracelet to inform people you have epilepsy in case of an emergency.

## 2023-08-05 NOTE — PROGRESS NOTE ADULT - SUBJECTIVE AND OBJECTIVE BOX
SUBJECTIVE/INTERVAL HISTORY:  - No acute events overnight    VITALS & EXAMINATION:  Vital Signs Last 24 Hrs  T(C): 36.3 (05 Aug 2023 05:08), Max: 36.8 (04 Aug 2023 21:39)  T(F): 97.3 (05 Aug 2023 05:08), Max: 98.2 (04 Aug 2023 21:39)  HR: 59 (05 Aug 2023 05:08) (56 - 64)  BP: 147/84 (05 Aug 2023 05:08) (107/65 - 147/84)  BP(mean): --  RR: 18 (05 Aug 2023 05:08) (18 - 19)  SpO2: 99% (05 Aug 2023 05:08) (96% - 99%)    Parameters below as of 05 Aug 2023 05:08  Patient On (Oxygen Delivery Method): room air        General:  Constitutional: Obese Female, appears stated age, in no apparent distress including pain  Head: Normocephalic & atraumatic.    Neurological (>12):  MS: Awake, alert, oriented to person, place, and time. Follows one step simple command. No dysarthria    CNs: VFF. EOMI no nystagmus, no diplopia. V1-3 intact to LT/pinprick, well developed masseter muscles b/l. No facial asymmetry b/l, full eye closure strength b/l. Hearing grossly normal (rubbing fingers) b/l. Symmetric palate elevation in midline. Gag reflex deferred. Head turning & shoulder shrug intact b/l. Tongue midline, normal movements, no atrophy.    Motor: Normal muscle bulk & tone. No noticeable tremor or seizure. All extremities are antigravity    Sensation: Intact to LT b/l throughout.     Coordination: intact rapid-alt movements. No dysmetria to FTN    Gait: Deferred    LABORATORY:  CBC                       12.5   8.47  )-----------( 131      ( 04 Aug 2023 06:09 )             37.1     Chem 08-04    138  |  103  |  14  ----------------------------<  100<H>  4.0   |  22  |  0.57    Ca    9.7      04 Aug 2023 06:09  Phos  4.4     08-04  Mg     2.1     08-04    TPro  7.1  /  Alb  3.8  /  TBili  0.2  /  DBili  x   /  AST  21  /  ALT  14  /  AlkPhos  76  08-04    LFTs LIVER FUNCTIONS - ( 04 Aug 2023 06:09 )  Alb: 3.8 g/dL / Pro: 7.1 g/dL / ALK PHOS: 76 U/L / ALT: 14 U/L / AST: 21 U/L / GGT: x           Coagulopathy   Lipid Panel   A1c   Cardiac enzymes     U/A Urinalysis Basic - ( 04 Aug 2023 06:09 )    Color: x / Appearance: x / SG: x / pH: x  Gluc: 100 mg/dL / Ketone: x  / Bili: x / Urobili: x   Blood: x / Protein: x / Nitrite: x   Leuk Esterase: x / RBC: x / WBC x   Sq Epi: x / Non Sq Epi: x / Bacteria: x      CSF  Immunological  Other    STUDIES & IMAGING:  Studies (EKG, EEG, EMG, etc):     Radiology (XR, CT, MR, U/S, TTE/RADHA):  EEG 8/4:  IMPRESSION/CLINICAL CORRELATE:  This is a normal VEEG. No epileptiform pattern or seizures were recorded

## 2023-08-05 NOTE — DISCHARGE NOTE PROVIDER - NSDCMRMEDTOKEN_GEN_ALL_CORE_FT
amLODIPine 10 mg oral tablet: 1 orally once a day (at bedtime)  aspirin 81 mg oral delayed release capsule: 1 tab(s) orally once a day (at bedtime)  bisoprolol 10 mg oral tablet: 1 orally once a day (at bedtime)  donepezil 10 mg oral tablet: 1 orally once a day (at bedtime)  folic acid 1 mg oral tablet: 1 orally once a day (at bedtime)  loratadine 10 mg oral tablet: 1 orally once a day (at bedtime)  losartan 100 mg oral tablet: 1 orally once a day (at bedtime)  montelukast 10 mg oral tablet: 1 orally once a day (at bedtime)  omeprazole 20 mg oral delayed release capsule: 1 cap(s) orally once a day (at bedtime)  Oyster Shell Calcium 500 (1250 mg calcium carbonate) oral tablet: 1 tab(s) orally once a day (at bedtime)  simvastatin 10 mg oral tablet: 1 orally once a day (at bedtime)  Tegretol  mg oral tablet, extended release: 1 tab(s) orally 2 times a day  vitamin E dl-alpha 1000 intl units oral capsule: 1 cap(s) orally once a day (at bedtime)  Xcopri Titration Pack 12.5 mg-25 mg oral tablet: 1 tab(s) orally once a day MDD: 1 tablet

## 2023-08-05 NOTE — DISCHARGE NOTE PROVIDER - NSDCFUSCHEDAPPT_GEN_ALL_CORE_FT
[facility]  [facility]  [facility]  Scheduled Appointment: 08/16/2023    [facility]  [facility]  [facility]  Scheduled Appointment: 08/16/2023    Lexie Oconnor  [facility]  NEUROLOGY [address] [facility]  Scheduled Appointment: 09/29/2023

## 2023-08-05 NOTE — EEG REPORT - NS EEG TEXT BOX
DAY 1 	START: 8/3/2023  4:06:07 PM     	END: 8/4/2023  08:00  	DURATION: 15 HR  15 MIN    DAILY EEG VISUAL ANALYSIS    The background was continuous, symmetric, spontaneously variable and reactive. During wakefulness, the posterior dominant rhythm consisted of symmetric, well-modulated 8.5 Hz activity, with amplitude to 30 uV, that attenuated to eye opening.  Low amplitude frontal beta was noted in wakefulness.   The anterior to posterior gradient was present.     BACKGROUND SLOWING:  No generalized background slowing was present.    FOCAL SLOWING:   None was present.    SLEEP BACKGROUND:  Drowsiness was characterized by fragmentation, attenuation, and slowing of the background activity.    Sleep was characterized by the presence of vertex waves, symmetric sleep spindles and K-complexes.    OTHER NON-EPILEPTIFORM FINDINGS:  None were present.    INTERICTAL EPILEPTIFORM ACTIVITY:   None were present.    EVENTS:  No events or seizures recorded.    ARTIFACTS:  Intermittent myogenic and movement artifacts were noted.    ECG:  The heart rate on single channel ECG was predominantly between 60-80 BPM.    ASMs:   CBZ 200mg BID  -------------------------------------------------------------------------------------------------------------------------------------------------------  EEG SUMMARY:  Normal EEG in the awake, drowsy and asleep states.    -------------------------------------------------------------------------------------------------------------------------------------------------------  IMPRESSION/CLINICAL CORRELATE:  This is a normal VEEG. No epileptiform pattern or seizures were recorded      Demetrius Ding MD  Neurology Attending Physician  
  DAY 2 	START: 8/4/2023 08:00  	END: 8/5/2023  10:30  	DURATION: 26.5 HR    DAILY EEG VISUAL ANALYSIS    The background was continuous, symmetric, spontaneously variable and reactive. During wakefulness, the posterior dominant rhythm consisted of symmetric, well-modulated 8.5 Hz activity, with amplitude to 30 uV, that attenuated to eye opening.  Low amplitude frontal beta was noted in wakefulness.   The anterior to posterior gradient was present.     BACKGROUND SLOWING:  No generalized background slowing was present.    FOCAL SLOWING:   Left temporal regions focal theta/delta, sharply contoured at times.        SLEEP BACKGROUND:  Drowsiness was characterized by fragmentation, attenuation, and slowing of the background activity.    Sleep was characterized by the presence of vertex waves, symmetric sleep spindles and K-complexes.    OTHER NON-EPILEPTIFORM FINDINGS:  None were present.    INTERICTAL EPILEPTIFORM ACTIVITY:   None      EVENTS:  No events or seizures recorded.    ARTIFACTS:  Intermittent myogenic and movement artifacts were noted.    ECG:  The heart rate on single channel ECG was predominantly between 60-80 BPM.    ASMs:   CBZ 200mg BID, BHARTI initiation    -------------------------------------------------------------------------------------------------------------------------------------------------------  EEG SUMMARY:  Abnormal EEG in the awake, drowsy and asleep states.  Left temporal regions focal theta/delta, sharply contoured at times.    -------------------------------------------------------------------------------------------------------------------------------------------------------  IMPRESSION/CLINICAL CORRELATE:  Left temporal focal cerebral dysfunction.           Eleazar Ball M.D.  Attending Physician, [facility] [place]

## 2023-08-05 NOTE — DISCHARGE NOTE NURSING/CASE MANAGEMENT/SOCIAL WORK - PATIENT PORTAL LINK FT
You can access the FollowMyHealth Patient Portal offered by [facility] by registering at the following website: [email or web address]. By joining [facility]’s FollowMyHealth portal, you will also be able to view your health information using other applications (apps) compatible with our system.

## 2023-08-16 ENCOUNTER — OUTPATIENT (OUTPATIENT)
Dept: OUTPATIENT SERVICES | Facility: HOSPITAL | Age: 68
LOS: 1 days | End: 2023-08-16
Payer: COMMERCIAL

## 2023-08-16 ENCOUNTER — APPOINTMENT (OUTPATIENT)
Dept: NUCLEAR MEDICINE | Facility: IMAGING CENTER | Age: 68
End: 2023-08-16
Payer: MEDICARE

## 2023-08-16 DIAGNOSIS — G40.119 LOCALIZATION-RELATED (FOCAL) (PARTIAL) SYMPTOMATIC EPILEPSY AND EPILEPTIC SYNDROMES WITH SIMPLE PARTIAL SEIZURES, INTRACTABLE, WITHOUT STATUS EPILEPTICUS: ICD-10-CM

## 2023-08-16 PROCEDURE — 78999 UNLISTED MISC PX DX NUC MED: CPT

## 2023-08-16 PROCEDURE — 78608 BRAIN IMAGING (PET): CPT | Mod: 26

## 2023-08-16 PROCEDURE — 78608 BRAIN IMAGING (PET): CPT

## 2023-08-16 PROCEDURE — A9552: CPT

## 2023-08-16 RX ORDER — ASPIRIN/CALCIUM CARB/MAGNESIUM 324 MG
1 TABLET ORAL
Refills: 0 | DISCHARGE

## 2023-08-16 RX ORDER — LOSARTAN POTASSIUM 100 MG/1
1 TABLET, FILM COATED ORAL
Refills: 0 | DISCHARGE

## 2023-08-16 RX ORDER — OMEPRAZOLE 10 MG/1
1 CAPSULE, DELAYED RELEASE ORAL
Refills: 0 | DISCHARGE

## 2023-08-16 RX ORDER — CARBAMAZEPINE 200 MG
1 TABLET ORAL
Refills: 0 | DISCHARGE

## 2023-08-16 RX ORDER — CALCIUM CARBONATE 500(1250)
1 TABLET ORAL
Refills: 0 | DISCHARGE

## 2023-08-16 RX ORDER — SIMVASTATIN 20 MG/1
1 TABLET, FILM COATED ORAL
Refills: 0 | DISCHARGE

## 2023-08-16 RX ORDER — MONTELUKAST 4 MG/1
1 TABLET, CHEWABLE ORAL
Refills: 0 | DISCHARGE

## 2023-08-16 RX ORDER — BISOPROLOL FUMARATE 10 MG/1
1 TABLET, FILM COATED ORAL
Refills: 0 | DISCHARGE

## 2023-08-16 RX ORDER — FOLIC ACID 0.8 MG
1 TABLET ORAL
Refills: 0 | DISCHARGE

## 2023-08-16 RX ORDER — DONEPEZIL HYDROCHLORIDE 10 MG/1
1 TABLET, FILM COATED ORAL
Refills: 0 | DISCHARGE

## 2023-08-16 RX ORDER — AMLODIPINE BESYLATE 2.5 MG/1
1 TABLET ORAL
Refills: 0 | DISCHARGE

## 2023-08-16 RX ORDER — LORATADINE 10 MG/1
1 TABLET ORAL
Refills: 0 | DISCHARGE

## 2023-08-25 ENCOUNTER — APPOINTMENT (OUTPATIENT)
Dept: NEUROLOGY | Facility: CLINIC | Age: 68
End: 2023-08-25
Payer: MEDICARE

## 2023-08-25 VITALS
DIASTOLIC BLOOD PRESSURE: 86 MMHG | SYSTOLIC BLOOD PRESSURE: 146 MMHG | HEIGHT: 60 IN | BODY MASS INDEX: 28.47 KG/M2 | HEART RATE: 58 BPM | WEIGHT: 145 LBS

## 2023-08-25 DIAGNOSIS — Z86.39 PERSONAL HISTORY OF OTHER ENDOCRINE, NUTRITIONAL AND METABOLIC DISEASE: ICD-10-CM

## 2023-08-25 DIAGNOSIS — Z86.79 PERSONAL HISTORY OF OTHER DISEASES OF THE CIRCULATORY SYSTEM: ICD-10-CM

## 2023-08-25 PROCEDURE — 99214 OFFICE O/P EST MOD 30 MIN: CPT

## 2023-08-25 RX ORDER — LORATADINE 10 MG/1
10 TABLET ORAL
Refills: 0 | Status: ACTIVE | COMMUNITY

## 2023-08-25 RX ORDER — OMEPRAZOLE 20 MG/1
20 CAPSULE, DELAYED RELEASE ORAL
Refills: 0 | Status: ACTIVE | COMMUNITY

## 2023-08-25 RX ORDER — SIMVASTATIN 10 MG/1
10 TABLET, FILM COATED ORAL
Refills: 0 | Status: ACTIVE | COMMUNITY

## 2023-08-25 RX ORDER — MONTELUKAST 10 MG/1
10 TABLET, FILM COATED ORAL
Refills: 0 | Status: ACTIVE | COMMUNITY

## 2023-08-25 RX ORDER — AMLODIPINE BESYLATE 10 MG/1
10 TABLET ORAL
Refills: 0 | Status: ACTIVE | COMMUNITY

## 2023-08-25 RX ORDER — CARBAMAZEPINE 200 MG/1
200 TABLET ORAL
Refills: 0 | Status: ACTIVE | COMMUNITY

## 2023-08-25 RX ORDER — LOSARTAN POTASSIUM 100 MG/1
100 TABLET, FILM COATED ORAL
Refills: 0 | Status: ACTIVE | COMMUNITY

## 2023-08-25 RX ORDER — FOLIC ACID 1 MG/1
1 TABLET ORAL
Refills: 0 | Status: ACTIVE | COMMUNITY

## 2023-08-25 RX ORDER — ASPIRIN 81 MG
81 TABLET, DELAYED RELEASE (ENTERIC COATED) ORAL
Refills: 0 | Status: ACTIVE | COMMUNITY

## 2023-08-25 RX ORDER — BISOPROLOL FUMARATE 10 MG/1
10 TABLET, FILM COATED ORAL
Refills: 0 | Status: ACTIVE | COMMUNITY

## 2023-08-25 RX ORDER — VITAMIN K2 90 MCG
1000 CAPSULE ORAL
Refills: 0 | Status: ACTIVE | COMMUNITY

## 2023-08-25 RX ORDER — DONEPEZIL HYDROCHLORIDE 10 MG/1
10 TABLET ORAL
Refills: 0 | Status: ACTIVE | COMMUNITY

## 2023-08-29 NOTE — ASSESSMENT
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 She has more seizures than she currently describes. Her memory decline is likely secondary to poor LT seizure control.  Plan: continue tegretol brand 200 bid and continue xcopri  w target monotherapy xcopri and off tegretol.
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
## 2023-08-29 NOTE — REASON FOR VISIT
[Follow-Up: _____] : a [unfilled] follow-up visit [Initial Eval - Existing Diagnosis] : an initial evaluation of an existing diagnosis [Family Member] : family member

## 2023-08-29 NOTE — PHYSICAL EXAM
[FreeTextEntry1] : MS: alert & oriented to person, place time, poor fund of knowledge and recall for recent and remote events. \par  CN: VFF to confrontation, EOM full without nystagmus, PERRL, V1-V3 intact to light touch, face symmetrical, hears finger rub bilaterally, palate elevates symmetrically, shoulders elevate symmetrically, tongue midline\par  MOTOR: normal tone x 4 extremities, Power 5/5 proximally and distally bilaterally, no drift, normal rapid alternating movements. \par  SENSORY: intact LT x 4 extremities\par  REFLEXES: biceps 2+ bilaterally, triceps 2+ bilaterally, brachioradialis 2+ bilaterally, patella 2+ bilaterally, ankle 2+ bilaterally, plantar flexor bilaterally\par  COORD: normal FNF, no tremor or dysmetria\par  GAIT: normal base, Romberg negative, normal gait, able to walk on toes, heels and tandem.\par

## 2023-09-25 RX ORDER — CENOBAMATE 50MG-100MG
14 X 50 MG & KIT ORAL
Qty: 28 | Refills: 0 | Status: DISCONTINUED | COMMUNITY
Start: 2023-08-25 | End: 2023-09-25

## 2023-09-29 ENCOUNTER — APPOINTMENT (OUTPATIENT)
Dept: NEUROLOGY | Facility: CLINIC | Age: 68
End: 2023-09-29

## 2023-10-02 PROBLEM — Z78.9 OTHER SPECIFIED HEALTH STATUS: Chronic | Status: ACTIVE | Noted: 2023-08-03

## 2023-10-26 RX ORDER — CENOBAMATE 150-200 MG
14 X 150 MG & KIT ORAL
Qty: 1 | Refills: 0 | Status: DISCONTINUED | COMMUNITY
Start: 2023-09-26 | End: 2023-10-26

## 2023-11-15 ENCOUNTER — APPOINTMENT (OUTPATIENT)
Dept: NEUROLOGY | Facility: CLINIC | Age: 68
End: 2023-11-15
Payer: MEDICARE

## 2023-11-15 VITALS
HEIGHT: 60 IN | SYSTOLIC BLOOD PRESSURE: 113 MMHG | WEIGHT: 145 LBS | BODY MASS INDEX: 28.47 KG/M2 | HEART RATE: 60 BPM | DIASTOLIC BLOOD PRESSURE: 76 MMHG

## 2023-11-15 PROCEDURE — 99214 OFFICE O/P EST MOD 30 MIN: CPT

## 2024-01-09 ENCOUNTER — RX RENEWAL (OUTPATIENT)
Age: 69
End: 2024-01-09

## 2024-03-06 ENCOUNTER — APPOINTMENT (OUTPATIENT)
Dept: NEUROLOGY | Facility: CLINIC | Age: 69
End: 2024-03-06

## 2024-04-16 ENCOUNTER — APPOINTMENT (OUTPATIENT)
Dept: NEUROLOGY | Facility: CLINIC | Age: 69
End: 2024-04-16
Payer: MEDICARE

## 2024-04-16 VITALS
BODY MASS INDEX: 26.7 KG/M2 | HEART RATE: 60 BPM | DIASTOLIC BLOOD PRESSURE: 79 MMHG | HEIGHT: 60 IN | WEIGHT: 136 LBS | SYSTOLIC BLOOD PRESSURE: 150 MMHG

## 2024-04-16 DIAGNOSIS — G40.119 LOCALIZATION-RELATED (FOCAL) (PARTIAL) SYMPTOMATIC EPILEPSY AND EPILEPTIC SYNDROMES WITH SIMPLE PARTIAL SEIZURES, INTRACTABLE, W/OUT STATUS EPILEPTICUS: ICD-10-CM

## 2024-04-16 PROCEDURE — 99214 OFFICE O/P EST MOD 30 MIN: CPT

## 2024-04-16 RX ORDER — MEMANTINE HYDROCHLORIDE 5 MG/1
5 TABLET, FILM COATED ORAL DAILY
Qty: 90 | Refills: 1 | Status: ACTIVE | COMMUNITY
Start: 2024-04-16 | End: 1900-01-01

## 2024-04-16 RX ORDER — CENOBAMATE 200 MG/1
200 TABLET, FILM COATED ORAL DAILY
Qty: 90 | Refills: 1 | Status: ACTIVE | COMMUNITY
Start: 2023-10-26 | End: 1900-01-01

## 2024-04-23 PROBLEM — G40.119 TEMPORAL LOBE EPILEPSY, INTRACTABLE: Status: ACTIVE | Noted: 2023-07-18

## 2024-04-23 NOTE — ASSESSMENT
[FreeTextEntry1] : JEFF whittington  LT lobe epilepsy well controlled on xcopri no seizures will add namenda

## 2024-06-18 ENCOUNTER — APPOINTMENT (OUTPATIENT)
Dept: NEUROLOGY | Facility: CLINIC | Age: 69
End: 2024-06-18

## 2024-06-25 ENCOUNTER — NON-APPOINTMENT (OUTPATIENT)
Age: 69
End: 2024-06-25

## 2024-06-26 ENCOUNTER — APPOINTMENT (OUTPATIENT)
Dept: NEUROLOGY | Facility: CLINIC | Age: 69
End: 2024-06-26

## 2024-06-26 VITALS
WEIGHT: 136 LBS | BODY MASS INDEX: 26.7 KG/M2 | SYSTOLIC BLOOD PRESSURE: 168 MMHG | HEART RATE: 57 BPM | HEIGHT: 60 IN | DIASTOLIC BLOOD PRESSURE: 85 MMHG

## 2024-06-26 PROCEDURE — 99214 OFFICE O/P EST MOD 30 MIN: CPT

## 2024-09-30 ENCOUNTER — APPOINTMENT (OUTPATIENT)
Dept: NEUROLOGY | Facility: CLINIC | Age: 69
End: 2024-09-30
Payer: MEDICARE

## 2024-09-30 ENCOUNTER — APPOINTMENT (OUTPATIENT)
Dept: NEUROLOGY | Facility: CLINIC | Age: 69
End: 2024-09-30

## 2024-09-30 VITALS
WEIGHT: 130 LBS | HEART RATE: 25 BPM | HEIGHT: 66 IN | SYSTOLIC BLOOD PRESSURE: 136 MMHG | DIASTOLIC BLOOD PRESSURE: 74 MMHG | BODY MASS INDEX: 20.89 KG/M2

## 2024-09-30 VITALS
DIASTOLIC BLOOD PRESSURE: 74 MMHG | WEIGHT: 134 LBS | HEART RATE: 52 BPM | BODY MASS INDEX: 26.31 KG/M2 | HEIGHT: 60 IN | SYSTOLIC BLOOD PRESSURE: 136 MMHG

## 2024-09-30 DIAGNOSIS — R63.4 ABNORMAL WEIGHT LOSS: ICD-10-CM

## 2024-09-30 DIAGNOSIS — G40.119 LOCALIZATION-RELATED (FOCAL) (PARTIAL) SYMPTOMATIC EPILEPSY AND EPILEPTIC SYNDROMES WITH SIMPLE PARTIAL SEIZURES, INTRACTABLE, W/OUT STATUS EPILEPTICUS: ICD-10-CM

## 2024-09-30 DIAGNOSIS — R41.3 OTHER AMNESIA: ICD-10-CM

## 2024-09-30 PROCEDURE — G2211 COMPLEX E/M VISIT ADD ON: CPT

## 2024-09-30 PROCEDURE — 99214 OFFICE O/P EST MOD 30 MIN: CPT

## 2024-09-30 NOTE — ASSESSMENT
[FreeTextEntry1] : JEFF ARGUETA, 69 yo RHF h/o open heart surgery ?20 years ago, on ASA 81 mg with  LT lobe epilepsy well controlled on Xcopri 200 mg qhs well tolerated, and Namenda 5 mg qhs, No interval seizures and denies memory progression. continue Namenda 5 mg qhs, Also with weight loss and depression.    Plan: - Continue Xcopri 200 mg qhs well tolerated. - Continue Namenda 5 mg qhs well tolerated, suggested to increase pt deferred.  - Discussed BetterHelp online resource for depression  - recommended w/up with PCP for weight loss.  - sz triggers discussed  -  all questions answered - Follow up with Dr Oconnor 2-3 months   x 35 min

## 2024-09-30 NOTE — PHYSICAL EXAM
[FreeTextEntry1] : MS: alert & oriented to person, place time, poor fund of knowledge and recall for recent and remote events.  CN: VFF to confrontation, EOM full without nystagmus, PERRL, V1-V3 intact to light touch, face symmetrical, hears finger rub bilaterally, palate elevates symmetrically, shoulders elevate symmetrically, tongue midline MOTOR: normal tone x 4 extremities, Power 5/5 proximally and distally bilaterally, no drift, normal rapid alternating movements.  SENSORY: intact LT x 4 extremities REFLEXES: biceps 2+ bilaterally, triceps 2+ bilaterally, brachioradialis 2+ bilaterally, patella 2+ bilaterally COORD: normal FNF, no tremor or dysmetria GAIT: normal base, normal gait.

## 2024-09-30 NOTE — REVIEW OF SYSTEMS
[Recent Weight Loss (___ Lbs)] : recent [unfilled] ~Ulb weight loss [Memory Lapses or Loss] : memory loss [Seizures] : convulsions [As Noted in HPI] : as noted in HPI [Anxiety] : anxiety [Negative] : Heme/Lymph

## 2024-09-30 NOTE — DISCUSSION/SUMMARY
[Well-controlled] : well-controlled [Risks Associated with Driving/[place] Law] : As per my usual protocol, the patient was advised in regards to risks and driving privileges associated with the [place] Guidelines.  [Safety Recommendations] : The patient was advised in regards to the risk of seizures and general seizure safety recommendations including not to be bathing alone, climbing to high places and operating heavy machinery. [Compliance with Medications] : The importance of compliance with medications was reinforced. [Medication Side Effects] : High frequency and serious potential medication adverse effects were reviewed with the patient, including but not exclusive to psychiatric effects.  Information sheets on medication side effects were made available to the patient in our clinic.  The patient or advocate agrees to notify us for any concerns. [Sleep Hygiene/Sleep Disruption Risks] : Sleep hygiene and the risks of sleep disruption were discussed.
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
## 2024-10-29 ENCOUNTER — APPOINTMENT (OUTPATIENT)
Dept: NEUROLOGY | Facility: CLINIC | Age: 69
End: 2024-10-29
Payer: MEDICARE

## 2024-10-29 VITALS
SYSTOLIC BLOOD PRESSURE: 133 MMHG | BODY MASS INDEX: 26.31 KG/M2 | WEIGHT: 134 LBS | HEIGHT: 60 IN | HEART RATE: 61 BPM | DIASTOLIC BLOOD PRESSURE: 71 MMHG

## 2024-10-29 DIAGNOSIS — G40.119 LOCALIZATION-RELATED (FOCAL) (PARTIAL) SYMPTOMATIC EPILEPSY AND EPILEPTIC SYNDROMES WITH SIMPLE PARTIAL SEIZURES, INTRACTABLE, W/OUT STATUS EPILEPTICUS: ICD-10-CM

## 2024-10-29 DIAGNOSIS — R41.3 OTHER AMNESIA: ICD-10-CM

## 2024-10-29 PROCEDURE — 99214 OFFICE O/P EST MOD 30 MIN: CPT

## 2024-12-30 ENCOUNTER — RX RENEWAL (OUTPATIENT)
Age: 69
End: 2024-12-30

## 2025-03-24 ENCOUNTER — RX RENEWAL (OUTPATIENT)
Age: 70
End: 2025-03-24